# Patient Record
Sex: FEMALE | Race: OTHER | Employment: FULL TIME | URBAN - METROPOLITAN AREA
[De-identification: names, ages, dates, MRNs, and addresses within clinical notes are randomized per-mention and may not be internally consistent; named-entity substitution may affect disease eponyms.]

---

## 2023-02-01 ENCOUNTER — HOSPITAL ENCOUNTER (OUTPATIENT)
Dept: LAB | Age: 63
Discharge: HOME | End: 2023-02-04
Payer: COMMERCIAL

## 2023-02-01 ENCOUNTER — OFFICE VISIT (OUTPATIENT)
Dept: ONCOLOGY | Age: 63
End: 2023-02-01
Payer: COMMERCIAL

## 2023-02-01 VITALS
SYSTOLIC BLOOD PRESSURE: 113 MMHG | BODY MASS INDEX: 21.61 KG/M2 | WEIGHT: 107.2 LBS | OXYGEN SATURATION: 99 % | TEMPERATURE: 97.5 F | HEART RATE: 64 BPM | HEIGHT: 59 IN | RESPIRATION RATE: 16 BRPM | DIASTOLIC BLOOD PRESSURE: 69 MMHG

## 2023-02-01 DIAGNOSIS — R74.8 ABNORMAL SERUM LEVEL OF ALKALINE PHOSPHATASE: ICD-10-CM

## 2023-02-01 DIAGNOSIS — R77.2 ABNORMAL ALPHA FETOPROTEIN (AFP) LEVEL: Primary | ICD-10-CM

## 2023-02-01 DIAGNOSIS — R74.01 ELEVATED AST (SGOT): ICD-10-CM

## 2023-02-01 DIAGNOSIS — R74.8 ELEVATED ALKALINE PHOSPHATASE LEVEL: Primary | ICD-10-CM

## 2023-02-01 LAB
ALBUMIN SERPL-MCNC: 3.9 G/DL (ref 3.2–4.6)
ALBUMIN/GLOB SERPL: 1 (ref 0.4–1.6)
ALP SERPL-CCNC: 149 U/L (ref 50–136)
ALT SERPL-CCNC: 52 U/L (ref 12–65)
ANION GAP SERPL CALC-SCNC: 3 MMOL/L (ref 2–11)
AST SERPL-CCNC: 41 U/L (ref 15–37)
BASOPHILS # BLD: 0.1 K/UL (ref 0–0.2)
BASOPHILS NFR BLD: 1 % (ref 0–2)
BILIRUB SERPL-MCNC: 0.5 MG/DL (ref 0.2–1.1)
BUN SERPL-MCNC: 16 MG/DL (ref 8–23)
CALCIUM SERPL-MCNC: 10.2 MG/DL (ref 8.3–10.4)
CHLORIDE SERPL-SCNC: 107 MMOL/L (ref 101–110)
CO2 SERPL-SCNC: 29 MMOL/L (ref 21–32)
CREAT SERPL-MCNC: 0.8 MG/DL (ref 0.6–1)
DIFFERENTIAL METHOD BLD: NORMAL
EOSINOPHIL # BLD: 0.1 K/UL (ref 0–0.8)
EOSINOPHIL NFR BLD: 2 % (ref 0.5–7.8)
ERYTHROCYTE [DISTWIDTH] IN BLOOD BY AUTOMATED COUNT: 13.3 % (ref 11.9–14.6)
GLOBULIN SER CALC-MCNC: 4.1 G/DL (ref 2.8–4.5)
GLUCOSE SERPL-MCNC: 84 MG/DL (ref 65–100)
HCT VFR BLD AUTO: 44.1 % (ref 35.8–46.3)
HGB BLD-MCNC: 14.6 G/DL (ref 11.7–15.4)
IMM GRANULOCYTES # BLD AUTO: 0 K/UL (ref 0–0.5)
IMM GRANULOCYTES NFR BLD AUTO: 0 % (ref 0–5)
LYMPHOCYTES # BLD: 1.8 K/UL (ref 0.5–4.6)
LYMPHOCYTES NFR BLD: 29 % (ref 13–44)
MCH RBC QN AUTO: 29.1 PG (ref 26.1–32.9)
MCHC RBC AUTO-ENTMCNC: 33.1 G/DL (ref 31.4–35)
MCV RBC AUTO: 87.8 FL (ref 82–102)
MONOCYTES # BLD: 0.4 K/UL (ref 0.1–1.3)
MONOCYTES NFR BLD: 7 % (ref 4–12)
NEUTS SEG # BLD: 3.7 K/UL (ref 1.7–8.2)
NEUTS SEG NFR BLD: 61 % (ref 43–78)
NRBC # BLD: 0 K/UL (ref 0–0.2)
PLATELET # BLD AUTO: 239 K/UL (ref 150–450)
PMV BLD AUTO: 10.5 FL (ref 9.4–12.3)
POTASSIUM SERPL-SCNC: 4.3 MMOL/L (ref 3.5–5.1)
PROT SERPL-MCNC: 8 G/DL (ref 6.3–8.2)
RBC # BLD AUTO: 5.02 M/UL (ref 4.05–5.2)
SODIUM SERPL-SCNC: 139 MMOL/L (ref 133–143)
WBC # BLD AUTO: 6.1 K/UL (ref 4.3–11.1)

## 2023-02-01 PROCEDURE — 36415 COLL VENOUS BLD VENIPUNCTURE: CPT

## 2023-02-01 PROCEDURE — 80053 COMPREHEN METABOLIC PANEL: CPT

## 2023-02-01 PROCEDURE — 99244 OFF/OP CNSLTJ NEW/EST MOD 40: CPT | Performed by: INTERNAL MEDICINE

## 2023-02-01 PROCEDURE — 85025 COMPLETE CBC W/AUTO DIFF WBC: CPT

## 2023-02-01 RX ORDER — ERGOCALCIFEROL 1.25 MG/1
50000 CAPSULE ORAL WEEKLY
COMMUNITY
Start: 2023-01-11

## 2023-02-01 RX ORDER — MULTIVIT-MIN/IRON/FOLIC ACID/K 18-600-40
CAPSULE ORAL
COMMUNITY

## 2023-02-01 RX ORDER — LINACLOTIDE 72 UG/1
CAPSULE, GELATIN COATED ORAL PRN
COMMUNITY
Start: 2022-12-28

## 2023-02-01 RX ORDER — ATORVASTATIN CALCIUM 10 MG/1
10 TABLET, FILM COATED ORAL DAILY
COMMUNITY
Start: 2023-01-11

## 2023-02-01 NOTE — PROGRESS NOTES
[facility]      Reason for Referral:  Elevated alkaline phosphatase measurement    Referring Provider:   Dr. Ever Esparza, [facility]    Primary Care Provider:  Dr. Ever Esparza, [facility]    Family History of Cancer/Hematologic Disorders: None    Presenting Symptoms: Abnormal findings on routine labs. Narrative with recent with Results/Procedures/Biopsies and Dates completed:  Ms. Rickey Naranjo is a 55-year-old  female with a medical history of hyperlipidemia, hemorrhoids, depression, and dysuria. Surgical history includes  section, breast lumpectomy (benign), augmentation mammaplasty, hysterectomy due to prolapse uterus, and cholecystectomy. Family history of depression, Alzheimer's, HTN, and hyperlipidemia. She denies use of any tobacco products, drugs, or alcohol. On 22 patient was seen to establish care with PCP. She was reporting pelvic pain and was referred to GYN for evaluation. Labs were drawn - Alk Phos 126, Alk Phos Bone 21.6, Hep B and C screenings were non-reactive. Referral to hematology for further evaluation and treatment recommendations. 22 PCP Labs              Notes from Referring Provider:  23 progress note PCP  Elevated alkaline phosphatase measurement  New problem  Ref to Hematology    Other Pertinent Information: Scheduled for colonoscopy with Dr. Myles on 23 with [facility].      Presented at Tumor Board:   No

## 2023-02-01 NOTE — PATIENT INSTRUCTIONS
Patient Instructions from Today's Visit    Reason for Visit:  New patient elevated alkaline phosphatase    Diagnosis Information:  [email or web address] net/about-us/asco-answers-patient-education-materials/hgva-irpyiff-wihu-sheets      Plan:  Ultrasonido del higado  GI doctor (gastroenterologo) erich langley para hacer oscar    Follow Up:  As needed    Recent Lab Results:  Hospital Outpatient Visit on 02/01/2023   Component Date Value Ref Range Status    WBC 02/01/2023 6.1  4.3 - 11.1 K/uL Final    RBC 02/01/2023 5.02  4.05 - 5.2 M/uL Final    Hemoglobin 02/01/2023 14.6  11.7 - 15.4 g/dL Final    Hematocrit 02/01/2023 44.1  35.8 - 46.3 % Final    MCV 02/01/2023 87.8  82.0 - 102.0 FL Final    MCH 02/01/2023 29.1  26.1 - 32.9 PG Final    MCHC 02/01/2023 33.1  31.4 - 35.0 g/dL Final    RDW 02/01/2023 13.3  11.9 - 14.6 % Final    Platelets 04/68/4528 239  150 - 450 K/uL Final    MPV 02/01/2023 10.5  9.4 - 12.3 FL Final    nRBC 02/01/2023 0.00  0.0 - 0.2 K/uL Final    **Note: Absolute NRBC parameter is now reported with Hemogram**    Differential Type 02/01/2023 AUTOMATED    Final    Seg Neutrophils 02/01/2023 61  43 - 78 % Final    Lymphocytes 02/01/2023 29  13 - 44 % Final    Monocytes 02/01/2023 7  4.0 - 12.0 % Final    Eosinophils % 02/01/2023 2  0.5 - 7.8 % Final    Basophils 02/01/2023 1  0.0 - 2.0 % Final    Immature Granulocytes 02/01/2023 0  0.0 - 5.0 % Final    Segs Absolute 02/01/2023 3.7  1.7 - 8.2 K/UL Final    Absolute Lymph # 02/01/2023 1.8  0.5 - 4.6 K/UL Final    Absolute Mono # 02/01/2023 0.4  0.1 - 1.3 K/UL Final    Absolute Eos # 02/01/2023 0.1  0.0 - 0.8 K/UL Final    Basophils Absolute 02/01/2023 0.1  0.0 - 0.2 K/UL Final    Absolute Immature Granulocyte 02/01/2023 0.0  0.0 - 0.5 K/UL Final    Sodium 02/01/2023 139  133 - 143 mmol/L Final    Potassium 02/01/2023 4.3  3.5 - 5.1 mmol/L Final    Chloride 02/01/2023 107  101 - 110 mmol/L Final    CO2 02/01/2023 29  21 - 32 mmol/L Final    Anion Gap 02/01/2023 3  2 - 11 mmol/L Final    Glucose 02/01/2023 84  65 - 100 mg/dL Final    BUN 02/01/2023 16  8 - 23 MG/DL Final    Creatinine 02/01/2023 0.80  0.6 - 1.0 MG/DL Final    Est, Glom Filt Rate 02/01/2023 >60  >60 ml/min/1.73m2 Final    Comment:      Pediatric calculator link: Mitchell.at. org/professionals/kdoqi/gfr_calculatorped       These results are not intended for use in patients <25years of age. eGFR results are calculated without a race factor using  the 2021 CKD-EPI equation. Careful clinical correlation is recommended, particularly when comparing to results calculated using previous equations. The CKD-EPI equation is less accurate in patients with extremes of muscle mass, extra-renal metabolism of creatinine, excessive creatine ingestion, or following therapy that affects renal tubular secretion. Calcium 02/01/2023 10.2  8.3 - 10.4 MG/DL Final    Total Bilirubin 02/01/2023 0.5  0.2 - 1.1 MG/DL Final    ALT 02/01/2023 52  12 - 65 U/L Final    AST 02/01/2023 41 (A)  15 - 37 U/L Final    Alk Phosphatase 02/01/2023 149 (A)  50 - 136 U/L Final    Total Protein 02/01/2023 8.0  6.3 - 8.2 g/dL Final    Albumin 02/01/2023 3.9  3.2 - 4.6 g/dL Final    Globulin 02/01/2023 4.1  2.8 - 4.5 g/dL Final    Albumin/Globulin Ratio 02/01/2023 1.0  0.4 - 1.6   Final         Treatment Summary has been discussed and given to patient: n/a        -------------------------------------------------------------------------------------------------------------------  Please call our office at [phone] if you have any  of the following symptoms:   Fever of 100.5 or greater  Chills  Shortness of breath  Swelling or pain in one leg    After office hours an answering service is available and will contact a provider for emergencies or if you are experiencing any of the above symptoms. Patient does not express an interest in My Chart.   My Chart log in information explained on the after visit summary printout at the check-out desk.     Deng Slaughter RN

## 2023-02-01 NOTE — PROGRESS NOTES
[facility]: Office Visit New Patient H/P    Chief Complaint:        History of Present Illness:  Reason for Referral:  Elevated alkaline phosphatase measurement     Referring Provider:   Dr. Evita Rajput, [facility]     Primary Care Provider:  Dr. Evita Rajput, [facility]     Family History of Cancer/Hematologic Disorders: None     Presenting Symptoms: Abnormal findings on routine labs. Ms. Karolina Baez is a 65 y.o.  female with a medical history of hyperlipidemia, hemorrhoids, depression, and dysuria. Surgical history includes  section, breast lumpectomy (benign), augmentation mammaplasty, hysterectomy due to prolapse uterus. Family history of depression, Alzheimer's, HTN, and hyperlipidemia. She denies use of any tobacco products, drugs, or alcohol. On 22 patient was seen to establish care with PCP. She was reporting pelvic pain and was referred to GYN for evaluation. Labs were drawn - Alk Phos 126, Alk Phos Bone 21.6, Hep B and C screenings were non-reactive. Referral to hematology for further evaluation and treatment recommendations. 22 PCP Labs           Notes from Referring Provider:  23 progress note PCP  Elevated alkaline phosphatase measurement  New problem  Ref to Hematology     Other Pertinent Information: Scheduled for colonoscopy with Dr. Sofie Patel on 23 with [facility].      Review of Systems:  Constitutional Denies fever or chills. Denies weight loss or appetite changes. Denies fatigue. Denies anorexia. HEENT Denies trauma, bluring vision, hearing loss, ear pain, nosebleeds, sore throat, neck pain and ear discharge. Skin Denies lesions or rashes. Lungs Denies shortness of breath, cough, sputum production or hemoptysis. Cardiovascular Denies chest pain, palpitations, orthopnea, claudication and leg swelling.    Gastrointestinal Denies nausea, vomiting, bowel changes. Denies bloody or black stools. Denies abdominal pain.  Denies dysuria, frequency or hesitancy of urination   Neuro Denies headaches, visual changes or ataxia. Denies dizziness, tingling, tremors, sensory change, speech change, focal weakness and headaches. Hematology Denies nasal/gum bleeding, denies easy bruise   Endo Denies heat/cold intolerance, denies diabetes. MSK Denies back pain, swollen legs, myalgias and falls. Psychiatric/Behavioral Denies depression and substance abuse. The patient is not nervous/anxious. No Known Allergies  History reviewed. No pertinent past medical history. History reviewed. No pertinent surgical history.   Family History   Problem Relation Age of Onset    Diabetes Mother     Stroke Father     High Blood Pressure Father      Social History     Socioeconomic History    Marital status: Single     Spouse name: Not on file    Number of children: Not on file    Years of education: Not on file    Highest education level: Not on file   Occupational History    Not on file   Tobacco Use    Smoking status: Never    Smokeless tobacco: Never   Vaping Use    Vaping Use: Never used   Substance and Sexual Activity    Alcohol use: Never    Drug use: Never    Sexual activity: Not on file   Other Topics Concern    Not on file   Social History Narrative    Not on file     Social Determinants of Health     Financial Resource Strain: Not on file   Food Insecurity: Not on file   Transportation Needs: Not on file   Physical Activity: Not on file   Stress: Not on file   Social Connections: Not on file   Intimate Partner Violence: Not on file   Housing Stability: Not on file     Current Outpatient Medications   Medication Sig Dispense Refill    ergocalciferol (ERGOCALCIFEROL) 1.25 MG (98636 UT) capsule Take 50,000 Units by mouth once a week      atorvastatin (LIPITOR) 10 MG tablet Take 10 mg by mouth daily      LINZESS 72 MCG CAPS capsule as needed      Vitamin D, Cholecalciferol, 50 MCG (2000 UT) CAPS Take by mouth       No current facility-administered medications for this visit. OBJECTIVE:  /69   Pulse 64   Temp 97.5 °F (36.4 °C)   Resp 16   Ht 4' 10.5\" (1.486 m)   Wt 107 lb 3.2 oz (48.6 kg)   SpO2 99%   BMI 22.02 kg/m²     Physical Exam:  Constitutional: Oriented to person, place, and time. Well-developed and well-nourished. HEENT: Normocephalic and atraumatic. Oropharynx is clear and moist.   Conjunctivae and EOM are normal. Pupils are equal, round, and reactive to light. No scleral icterus. Neck supple. No JVD present. No tracheal deviation present. No thyromegaly present. Lymph node No palpable submandibular, cervical, supraclavicular, axillary and inguinal lymph nodes. Skin Warm and dry. No bruising and no rash noted. No erythema. No pallor. Respiratory Effort normal and breath sounds normal.  No respiratory distress. No wheezes. No rales. No tenderness. CVS Normal rate, regular rhythm and normal heart sounds. Exam reveals no gallop, no friction and no rub. No murmur heard. Abdomen Soft. Bowel sounds are normal. Exhibits no distension. There is no tenderness. There is no rebound and no guarding. Neuro Normal reflexes. No cranial nerve deficit. Exhibits normal muscle tone, 5 of 5 strength of all extremities. MSK Normal range of motion in general.  No edema and no tenderness.    Psych Normal mood, affect, behavior, judgment and thought content      Labs:  Recent Results (from the past 24 hour(s))   CBC with Auto Differential    Collection Time: 02/01/23  1:50 PM   Result Value Ref Range    WBC 6.1 4.3 - 11.1 K/uL    RBC 5.02 4.05 - 5.2 M/uL    Hemoglobin 14.6 11.7 - 15.4 g/dL    Hematocrit 44.1 35.8 - 46.3 %    MCV 87.8 82.0 - 102.0 FL    MCH 29.1 26.1 - 32.9 PG    MCHC 33.1 31.4 - 35.0 g/dL    RDW 13.3 11.9 - 14.6 %    Platelets 729 752 - 705 K/uL    MPV 10.5 9.4 - 12.3 FL    nRBC 0.00 0.0 - 0.2 K/uL    Differential Type AUTOMATED      Seg Neutrophils 61 43 - 78 %    Lymphocytes 29 13 - 44 %    Monocytes 7 4.0 - 12.0 %    Eosinophils % 2 0.5 - 7.8 %    Basophils 1 0.0 - 2.0 %    Immature Granulocytes 0 0.0 - 5.0 %    Segs Absolute 3.7 1.7 - 8.2 K/UL    Absolute Lymph # 1.8 0.5 - 4.6 K/UL    Absolute Mono # 0.4 0.1 - 1.3 K/UL    Absolute Eos # 0.1 0.0 - 0.8 K/UL    Basophils Absolute 0.1 0.0 - 0.2 K/UL    Absolute Immature Granulocyte 0.0 0.0 - 0.5 K/UL   Comprehensive Metabolic Panel    Collection Time: 02/01/23  1:50 PM   Result Value Ref Range    Sodium 139 133 - 143 mmol/L    Potassium 4.3 3.5 - 5.1 mmol/L    Chloride 107 101 - 110 mmol/L    CO2 29 21 - 32 mmol/L    Anion Gap 3 2 - 11 mmol/L    Glucose 84 65 - 100 mg/dL    BUN 16 8 - 23 MG/DL    Creatinine 0.80 0.6 - 1.0 MG/DL    Est, Glom Filt Rate >60 >60 ml/min/1.73m2    Calcium 10.2 8.3 - 10.4 MG/DL    Total Bilirubin 0.5 0.2 - 1.1 MG/DL    ALT 52 12 - 65 U/L    AST 41 (H) 15 - 37 U/L    Alk Phosphatase 149 (H) 50 - 136 U/L    Total Protein 8.0 6.3 - 8.2 g/dL    Albumin 3.9 3.2 - 4.6 g/dL    Globulin 4.1 2.8 - 4.5 g/dL    Albumin/Globulin Ratio 1.0 0.4 - 1.6         Imaging:  No results found for this or any previous visit. ASSESSMENT/PLAN:   Diagnosis Orders   1. Elevated alkaline phosphatase level  US LIVER SPLEEN    External Referral To Gastroenterology      2. Elevated AST (SGOT)          65 y.o. F is evaluated for elevated alk phos presented to [facility] on 2/1/2023. It is noted in the record that she had history of cholecystectomy, which she denies and exam showed no sign of surgical scar for cholecystectomy. She established with new PCP and regular work showed mild elevation of alk phos and consulted hematology.   She reports these had first being found in 4/2020 to at least, no symptoms associated and it does not appear progressive, discussed that this would be very atypical for malignancy but various other causes e.g. cholestasis or biliary disease or common, no obvious drug/toxin being suspicious, arrange to have liver ultrasound and consult GI/hepatology, call as needed. Visit with related assistance of . All questions are answered to their satisfaction. They will call for further questions and concerns. ECOG PERFORMANCE STATUS - 0-Fully active, able to carry on all pre-disease performance without restriction. Pain - 0 - No pain/10. None/Minimal pain - not affecting QOL     Fatigue - No flowsheet data found. Distress - No flowsheet data found. Total time independently spent on today's visit was 45min. This time included: face-to-face time evaluating the patient as well as additional non-face-to-face time spent on: Preparing to see the patient by obtaining and reviewing previous test results, records and medical history, Performing a medically appropriate history and exam and documenting relevant clinical information for this visit, Counseling and educating patient and family, Ordering tests, Communicating with other health care professionals and Referring patient to another health care provider. Elements of this note have been dictated via voice recognition software. Text and phrases may be limited by the accuracy and autoconversion of the software. The chart has been reviewed, but errors may still be present. Rajan Smith M.D.   Adrián Estrada  [address] [place] [address]  Office : [phone]  Fax : [phone]

## 2024-03-11 ENCOUNTER — TELEPHONE (OUTPATIENT)
Dept: ONCOLOGY | Age: 64
End: 2024-03-11

## 2024-03-11 NOTE — TELEPHONE ENCOUNTER
Referral received from pt's PCP requesting hematology evaluation of elevated ALP. Pt established with Dr. Sinha for same dx. Last seen 2/1/23. TE entered and routed to [facility] front office for f/u scheduling.

## 2024-08-19 ENCOUNTER — TELEPHONE (OUTPATIENT)
Dept: ONCOLOGY | Age: 64
End: 2024-08-19

## 2024-08-23 DIAGNOSIS — R74.8 ELEVATED ALKALINE PHOSPHATASE LEVEL: Primary | ICD-10-CM

## 2024-08-27 ENCOUNTER — HOSPITAL ENCOUNTER (OUTPATIENT)
Dept: LAB | Age: 64
Discharge: HOME | End: 2024-08-30
Payer: COMMERCIAL

## 2024-08-27 ENCOUNTER — OFFICE VISIT (OUTPATIENT)
Dept: ONCOLOGY | Age: 64
End: 2024-08-27
Payer: COMMERCIAL

## 2024-08-27 VITALS
HEART RATE: 62 BPM | BODY MASS INDEX: 21.03 KG/M2 | OXYGEN SATURATION: 99 % | DIASTOLIC BLOOD PRESSURE: 71 MMHG | HEIGHT: 59 IN | RESPIRATION RATE: 18 BRPM | SYSTOLIC BLOOD PRESSURE: 116 MMHG | TEMPERATURE: 97.4 F | WEIGHT: 104.3 LBS

## 2024-08-27 DIAGNOSIS — R74.8 ELEVATED ALKALINE PHOSPHATASE LEVEL: Primary | ICD-10-CM

## 2024-08-27 DIAGNOSIS — R74.8 ELEVATED ALKALINE PHOSPHATASE LEVEL: ICD-10-CM

## 2024-08-27 LAB
ALBUMIN SERPL-MCNC: 3.9 G/DL (ref 3.2–4.6)
ALBUMIN/GLOB SERPL: 1.2 (ref 1–1.9)
ALP SERPL-CCNC: 121 U/L (ref 35–104)
ALT SERPL-CCNC: 34 U/L (ref 12–65)
ANION GAP SERPL CALC-SCNC: 10 MMOL/L (ref 9–18)
AST SERPL-CCNC: 37 U/L (ref 15–37)
BASOPHILS # BLD: 0.1 K/UL (ref 0–0.2)
BASOPHILS NFR BLD: 1 % (ref 0–2)
BILIRUB SERPL-MCNC: 0.5 MG/DL (ref 0–1.2)
BUN SERPL-MCNC: 20 MG/DL (ref 8–23)
CALCIUM SERPL-MCNC: 10 MG/DL (ref 8.8–10.2)
CHLORIDE SERPL-SCNC: 105 MMOL/L (ref 98–107)
CO2 SERPL-SCNC: 26 MMOL/L (ref 20–28)
CREAT SERPL-MCNC: 0.77 MG/DL (ref 0.6–1.1)
DIFFERENTIAL METHOD BLD: NORMAL
EOSINOPHIL # BLD: 0.1 K/UL (ref 0–0.8)
EOSINOPHIL NFR BLD: 3 % (ref 0.5–7.8)
ERYTHROCYTE [DISTWIDTH] IN BLOOD BY AUTOMATED COUNT: 13.3 % (ref 11.9–14.6)
GLOBULIN SER CALC-MCNC: 3.2 G/DL (ref 2.3–3.5)
GLUCOSE SERPL-MCNC: 91 MG/DL (ref 70–99)
HCT VFR BLD AUTO: 44.5 % (ref 35.8–46.3)
HGB BLD-MCNC: 14.8 G/DL (ref 11.7–15.4)
IMM GRANULOCYTES # BLD AUTO: 0 K/UL (ref 0–0.5)
IMM GRANULOCYTES NFR BLD AUTO: 0 % (ref 0–5)
LYMPHOCYTES # BLD: 1.7 K/UL (ref 0.5–4.6)
LYMPHOCYTES NFR BLD: 37 % (ref 13–44)
MCH RBC QN AUTO: 29.4 PG (ref 26.1–32.9)
MCHC RBC AUTO-ENTMCNC: 33.3 G/DL (ref 31.4–35)
MCV RBC AUTO: 88.5 FL (ref 82–102)
MONOCYTES # BLD: 0.3 K/UL (ref 0.1–1.3)
MONOCYTES NFR BLD: 7 % (ref 4–12)
NEUTS SEG # BLD: 2.4 K/UL (ref 1.7–8.2)
NEUTS SEG NFR BLD: 52 % (ref 43–78)
NRBC # BLD: 0 K/UL (ref 0–0.2)
PLATELET # BLD AUTO: 194 K/UL (ref 150–450)
PMV BLD AUTO: 11.1 FL (ref 9.4–12.3)
POTASSIUM SERPL-SCNC: 4.2 MMOL/L (ref 3.5–5.1)
PROT SERPL-MCNC: 7.2 G/DL (ref 6.3–8.2)
RBC # BLD AUTO: 5.03 M/UL (ref 4.05–5.2)
SODIUM SERPL-SCNC: 141 MMOL/L (ref 136–145)
WBC # BLD AUTO: 4.6 K/UL (ref 4.3–11.1)

## 2024-08-27 PROCEDURE — 36415 COLL VENOUS BLD VENIPUNCTURE: CPT

## 2024-08-27 PROCEDURE — 80053 COMPREHEN METABOLIC PANEL: CPT

## 2024-08-27 PROCEDURE — 85025 COMPLETE CBC W/AUTO DIFF WBC: CPT

## 2024-08-27 PROCEDURE — 99214 OFFICE O/P EST MOD 30 MIN: CPT | Performed by: INTERNAL MEDICINE

## 2024-08-27 ASSESSMENT — PATIENT HEALTH QUESTIONNAIRE - PHQ9
SUM OF ALL RESPONSES TO PHQ QUESTIONS 1-9: 0
1. LITTLE INTEREST OR PLEASURE IN DOING THINGS: NOT AT ALL
2. FEELING DOWN, DEPRESSED OR HOPELESS: NOT AT ALL
SUM OF ALL RESPONSES TO PHQ9 QUESTIONS 1 & 2: 0
SUM OF ALL RESPONSES TO PHQ QUESTIONS 1-9: 0

## 2024-08-27 NOTE — PATIENT INSTRUCTIONS
Patient Information from Today's Visit    The members of your [facility] are listed below:    Physician Provider: Freddy Sinha Medical Oncologist  Advanced Practice Clinician: Torri Sethi NP  Registered Nurse: Radha MEANS   Navigator: N/A  Medical Assistant: Sandy FLAHERTY MA  : Carla VALENCIA   Supportive Care Services: Roni ROUSSEAU LMSW    Diagnosis: Elevated alkaline phosphatase      Follow Up Instructions: Dr. Sinha reviewed your labs and thinks it better that you see a GI doctor, not a hematologist.      Treatment Summary has been discussed and given to patient:N/A      Current Labs:   Hospital Outpatient Visit on 08/27/2024   Component Date Value Ref Range Status    WBC 08/27/2024 4.6  4.3 - 11.1 K/uL Final    RBC 08/27/2024 5.03  4.05 - 5.2 M/uL Final    Hemoglobin 08/27/2024 14.8  11.7 - 15.4 g/dL Final    Hematocrit 08/27/2024 44.5  35.8 - 46.3 % Final    MCV 08/27/2024 88.5  82.0 - 102.0 FL Final    MCH 08/27/2024 29.4  26.1 - 32.9 PG Final    MCHC 08/27/2024 33.3  31.4 - 35.0 g/dL Final    RDW 08/27/2024 13.3  11.9 - 14.6 % Final    Platelets 08/27/2024 194  150 - 450 K/uL Final    MPV 08/27/2024 11.1  9.4 - 12.3 FL Final    nRBC 08/27/2024 0.00  0.0 - 0.2 K/uL Final    **Note: Absolute NRBC parameter is now reported with Hemogram**    Neutrophils % 08/27/2024 52  43 - 78 % Final    Lymphocytes % 08/27/2024 37  13 - 44 % Final    Monocytes % 08/27/2024 7  4.0 - 12.0 % Final    Eosinophils % 08/27/2024 3  0.5 - 7.8 % Final    Basophils % 08/27/2024 1  0.0 - 2.0 % Final    Immature Granulocytes % 08/27/2024 0  0.0 - 5.0 % Final    Neutrophils Absolute 08/27/2024 2.4  1.7 - 8.2 K/UL Final    Lymphocytes Absolute 08/27/2024 1.7  0.5 - 4.6 K/UL Final    Monocytes Absolute 08/27/2024 0.3  0.1 - 1.3 K/UL Final    Eosinophils Absolute 08/27/2024 0.1  0.0 - 0.8 K/UL Final    Basophils Absolute 08/27/2024 0.1  0.0 - 0.2 K/UL Final    Immature Granulocytes Absolute 08/27/2024 0.0  0.0 - 0.5 K/UL Final     Differential Type 08/27/2024 AUTOMATED    Final    Sodium 08/27/2024 141  136 - 145 mmol/L Final    Potassium 08/27/2024 4.2  3.5 - 5.1 mmol/L Final    Chloride 08/27/2024 105  98 - 107 mmol/L Final    CO2 08/27/2024 26  20 - 28 mmol/L Final    Anion Gap 08/27/2024 10  9 - 18 mmol/L Final    Glucose 08/27/2024 91  70 - 99 mg/dL Final    Comment: <70 mg/dL Consistent with, but not fully diagnostic of hypoglycemia.  100 - 125 mg/dL Impaired fasting glucose/consistent with pre-diabetes mellitus.  > 126 mg/dl Fasting glucose consistent with overt diabetes mellitus      BUN 08/27/2024 20  8 - 23 MG/DL Final    Creatinine 08/27/2024 0.77  0.60 - 1.10 MG/DL Final    Est, Glom Filt Rate 08/27/2024 87  >60 ml/min/1.73m2 Final    Comment:    Pediatric calculator link: https://www.kidney.org/professionals/kdoqi/gfr_calculatorped     These results are not intended for use in patients <18 years of age.     eGFR results are calculated without a race factor using  the 2021 CKD-EPI equation. Careful clinical correlation is recommended, particularly when comparing to results calculated using previous equations.  The CKD-EPI equation is less accurate in patients with extremes of muscle mass, extra-renal metabolism of creatinine, excessive creatine ingestion, or following therapy that affects renal tubular secretion.      Calcium 08/27/2024 10.0  8.8 - 10.2 MG/DL Final    Total Bilirubin 08/27/2024 0.5  0.0 - 1.2 MG/DL Final    ALT 08/27/2024 34  12 - 65 U/L Final    AST 08/27/2024 37  15 - 37 U/L Final    Alk Phosphatase 08/27/2024 121 (H)  35 - 104 U/L Final    Total Protein 08/27/2024 7.2  6.3 - 8.2 g/dL Final    Albumin 08/27/2024 3.9  3.2 - 4.6 g/dL Final    Globulin 08/27/2024 3.2  2.3 - 3.5 g/dL Final    Albumin/Globulin Ratio 08/27/2024 1.2  1.0 - 1.9   Final                Please refer to After Visit Summary or MyChart for upcoming appointment information. Please call our office for rescheduling needs at least 24 hours

## 2024-08-27 NOTE — PROGRESS NOTES
metabolic syndrome management, return as needed. Visit was with assistance of .    All questions are answered to their satisfaction. They will call for further questions and concerns.        ECOG PERFORMANCE STATUS - 0-Fully active, able to carry on all pre-disease performance without restriction.    Pain - 0 - No pain/10. None/Minimal pain - not affecting QOL     Fatigue - Failed to redirect to the Timeline version of the [employer] SmartLink.  Distress -        No data to display                    Total time independently spent on today's visit was 30min. This time included: face-to-face time evaluating the patient as well as additional non-face-to-face time spent on: Preparing to see the patient by obtaining and reviewing previous test results, records and medical history, Performing a medically appropriate history and exam and documenting relevant clinical information for this visit, Counseling and educating patient and family, Ordering tests, Communicating with other health care professionals and Referring patient to another health care provider.    Elements of this note have been dictated via voice recognition software.  Text and phrases may be limited by the accuracy and autoconversion of the software.  The chart has been reviewed, but errors may still be present.          [place] [address]  Office : [phone]  Fax : [phone]

## 2024-12-13 ENCOUNTER — TRANSCRIBE ORDERS (OUTPATIENT)
Dept: SCHEDULING | Age: 64
End: 2024-12-13

## 2024-12-13 DIAGNOSIS — Z98.82 HX OF BILATERAL BREAST IMPLANTS: Primary | ICD-10-CM

## 2024-12-13 DIAGNOSIS — N64.4 BREAST PAIN, RIGHT: ICD-10-CM

## 2025-03-17 ENCOUNTER — INITIAL CONSULT (OUTPATIENT)
Age: 65
End: 2025-03-17
Payer: COMMERCIAL

## 2025-03-17 VITALS
WEIGHT: 105.8 LBS | HEART RATE: 65 BPM | DIASTOLIC BLOOD PRESSURE: 76 MMHG | SYSTOLIC BLOOD PRESSURE: 120 MMHG | HEIGHT: 59 IN | BODY MASS INDEX: 21.33 KG/M2

## 2025-03-17 DIAGNOSIS — E78.00 PURE HYPERCHOLESTEROLEMIA: Chronic | ICD-10-CM

## 2025-03-17 DIAGNOSIS — Z01.810 PRE-OPERATIVE CARDIOVASCULAR EXAMINATION: Primary | ICD-10-CM

## 2025-03-17 DIAGNOSIS — Z76.89 ENCOUNTER TO ESTABLISH CARE WITH NEW DOCTOR: ICD-10-CM

## 2025-03-17 PROCEDURE — 93000 ELECTROCARDIOGRAM COMPLETE: CPT | Performed by: INTERNAL MEDICINE

## 2025-03-17 PROCEDURE — 99244 OFF/OP CNSLTJ NEW/EST MOD 40: CPT | Performed by: INTERNAL MEDICINE

## 2025-03-17 ASSESSMENT — ENCOUNTER SYMPTOMS: SHORTNESS OF BREATH: 0

## 2025-03-17 NOTE — PROGRESS NOTES
[address] [facility], [address] [place] [address]  PHONE: [phone]    SUBJECTIVE:   Greer Hall is a 64 y.o. female 1960   seen for a consultation visit regarding the following:     Chief Complaint   Patient presents with    Consultation     Abnormal EKG    Surgical Clearance              Consultation is requested for evaluation of Consultation (Abnormal EKG) and Surgical Clearance   .    History of present illness: 64 y.o. female presenting for pre-operative risk stratification. Patient to undergo removal of BL breast implants. She feels well overall. No chest pain, dyspnea, light-headedness, syncope or palpitations.      Past Medical History, Past Surgical History, Family history, Social History, and Medications were all reviewed with the patient today and updated as necessary.       Allergies   Allergen Reactions    Cyanoacrylate Itching     Itching and redness     History reviewed. No pertinent past medical history.  History reviewed. No pertinent surgical history.  Family History   Problem Relation Age of Onset    Diabetes Mother     Stroke Father     High Blood Pressure Father      Social History     Tobacco Use    Smoking status: Never     Passive exposure: Never    Smokeless tobacco: Never   Substance Use Topics    Alcohol use: Never       ROS:    Review of Systems   Cardiovascular:  Negative for chest pain, dyspnea on exertion, palpitations and syncope.   Respiratory:  Negative for shortness of breath.    Neurological:  Negative for light-headedness.          PHYSICAL EXAM:   /76   Pulse 65   Ht 1.486 m (4' 10.5\")   Wt 48 kg (105 lb 12.8 oz)   BMI 21.74 kg/m²      Physical Exam  Constitutional:       General: She is not in acute distress.     Appearance: Normal appearance.   HENT:      Head: Normocephalic and atraumatic.      Mouth/Throat:      Mouth: Mucous membranes are moist.   Eyes:      General: No scleral icterus.     Extraocular Movements: Extraocular movements